# Patient Record
Sex: FEMALE | Race: WHITE | NOT HISPANIC OR LATINO | Employment: FULL TIME | URBAN - METROPOLITAN AREA
[De-identification: names, ages, dates, MRNs, and addresses within clinical notes are randomized per-mention and may not be internally consistent; named-entity substitution may affect disease eponyms.]

---

## 2017-01-31 ENCOUNTER — TRANSFERRED RECORDS (OUTPATIENT)
Dept: HEALTH INFORMATION MANAGEMENT | Facility: CLINIC | Age: 27
End: 2017-01-31

## 2018-10-04 ENCOUNTER — OFFICE VISIT (OUTPATIENT)
Dept: FAMILY MEDICINE | Facility: CLINIC | Age: 28
End: 2018-10-04
Payer: COMMERCIAL

## 2018-10-04 VITALS
SYSTOLIC BLOOD PRESSURE: 110 MMHG | HEART RATE: 79 BPM | DIASTOLIC BLOOD PRESSURE: 60 MMHG | OXYGEN SATURATION: 98 % | HEIGHT: 64 IN | WEIGHT: 151.9 LBS | TEMPERATURE: 98 F | BODY MASS INDEX: 25.93 KG/M2

## 2018-10-04 DIAGNOSIS — K58.0 IRRITABLE BOWEL SYNDROME WITH DIARRHEA: Primary | ICD-10-CM

## 2018-10-04 PROCEDURE — 99214 OFFICE O/P EST MOD 30 MIN: CPT | Performed by: PHYSICIAN ASSISTANT

## 2018-10-04 RX ORDER — CETIRIZINE HYDROCHLORIDE 10 MG/1
10 TABLET ORAL DAILY
COMMUNITY

## 2018-10-04 NOTE — MR AVS SNAPSHOT
"              After Visit Summary   10/4/2018    Shima Carvalho    MRN: [id number]           Patient Information     Date Of Birth          1990        Visit Information        Provider Department      10/4/2018 9:00 AM Aaseby-Aguilera, Ramona Ann, PA-C [facility]        Today's Diagnoses     Probable Irritable Bowel Syndrome with Diarrhea    -  1       Follow-ups after your visit        Follow-up notes from your care team     Return in about 6 months (around 2019) for Physical Exam.      Who to contact     If you have questions or need follow up information about today's clinic visit or your schedule please contact [facility] directly at [phone].  Normal or non-critical lab and imaging results will be communicated to you by MyChart, letter or phone within 4 business days after the clinic has received the results. If you do not hear from us within 7 days, please contact the clinic through MyChart or phone. If you have a critical or abnormal lab result, we will notify you by phone as soon as possible.  Submit refill requests through [facility] or call your pharmacy and they will forward the refill request to us. Please allow 3 business days for your refill to be completed.          Additional Information About Your Visit        MyChart Information     [facility] lets you send messages to your doctor, view your test results, renew your prescriptions, schedule appointments and more. To sign up, go to [email or web address] . Click on \"Log in\" on the left side of the screen, which will take you to the Welcome page. Then click on \"Sign up Now\" on the right side of the page.     You will be asked to enter the access code listed below, as well as some personal information. Please follow the directions to create your username and password.     Your access code is: [email or web address]  Expires: 2018  9:05 AM     Your access code will  in 90 days. If you need help or a new code, " "please call your [place] clinic or [phone].        Care EveryWhere ID     This is your Care EveryWhere ID. This could be used by other organizations to access your [place] medical records  [id number]        Your Vitals Were     Pulse Temperature Height Pulse Oximetry BMI (Body Mass Index)       79 98  F (36.7  C) (Oral) 5' 4\" (1.626 m) 98% 26.07 kg/m2        Blood Pressure from Last 3 Encounters:   10/04/18 110/60   10/28/16 101/70   03/28/15 106/70    Weight from Last 3 Encounters:   10/04/18 151 lb 14.4 oz (68.9 kg)   10/28/16 153 lb 6.4 oz (69.6 kg)   03/28/15 144 lb (65.3 kg)              Today, you had the following     No orders found for display         Today's Medication Changes          These changes are accurate as of 10/4/18 12:55 PM.  If you have any questions, ask your nurse or doctor.               Stop taking these medicines if you haven't already. Please contact your care team if you have questions.     GUANFACINE HCL PO   Stopped by:  Aaseby-Aguilera, Ramona Ann, PA-C                    Primary Care Provider Office Phone # Fax #    Nadir Meneses [phone]       [facility] [address]  [place] [address]        Equal Access to Services     [facility] Center[facility]IANA : Hadii kyara parker hadgregoryo Sorodney, waaxda luqadaha, qaybta kaalmada ashleigh, joie gonzalez . So [facility] [phone].    ATENCIÓN: Si habla español, tiene a nina disposición servicios gratuitos de asistencia lingüística. Llame al [phone].    We comply with applicable federal civil rights laws and [place] laws. We do not discriminate on the basis of race, color, national origin, age, disability, sex, sexual orientation, or gender identity.            Thank you!     Thank you for choosing [facility]  for your care. Our goal is always to provide you with excellent care. Hearing back from our patients is one way we can continue to improve " our services. Please take a few minutes to complete the written survey that you may receive in the mail after your visit with us. Thank you!             Your Updated Medication List - Protect others around you: Learn how to safely use, store and throw away your medicines at www.disposemymeds.org.          This list is accurate as of 10/4/18 12:55 PM.  Always use your most recent med list.                   Brand Name Dispense Instructions for use Diagnosis    cetirizine 10 MG tablet    zyrTEC     Take 10 mg by mouth daily        fluticasone 50 MCG/ACT spray    FLONASE    1 Package    Spray 1-2 sprays into both nostrils daily    Acute sinusitis

## 2018-10-04 NOTE — PROGRESS NOTES
"  SUBJECTIVE:   Shima Carvalho is a 28 year old female who presents to clinic today for the following health issues:      Discuss IBS  -  Patient has forms to fill out for work     Carson has had trouble with gut pain and diarrhea for a number of years and was checked a couple years ago for celiac and labs came back normal.  She gave up wheat and stated she had a great deal of improvement but she does have periods where she will have the cramping and diarrhea and in her position at work she has trouble leaving to use the bathroom and she states she ends up having to leave work  She would like to get FMLA paperwork filled out to allow her 1 day a month if she should need it          Problem list and histories reviewed & adjusted, as indicated.  Additional history: as documented    Current Outpatient Prescriptions   Medication Sig Dispense Refill     cetirizine (ZYRTEC) 10 MG tablet Take 10 mg by mouth daily       fluticasone (FLONASE) 50 MCG/ACT nasal spray Spray 1-2 sprays into both nostrils daily 1 Package 0     BP Readings from Last 3 Encounters:   10/04/18 110/60   10/28/16 101/70   03/28/15 106/70    Wt Readings from Last 3 Encounters:   10/04/18 151 lb 14.4 oz (68.9 kg)   10/28/16 153 lb 6.4 oz (69.6 kg)   03/28/15 144 lb (65.3 kg)                    Reviewed and updated as needed this visit by clinical staff  Allergies       Reviewed and updated as needed this visit by Provider         ROS:  Constitutional, HEENT, cardiovascular, pulmonary, gi and gu systems are negative, except as otherwise noted.    OBJECTIVE:                                                    /60 (BP Location: Right arm, Patient Position: Chair, Cuff Size: Adult Large)  Pulse 79  Temp 98  F (36.7  C) (Oral)  Ht 5' 4\" (1.626 m)  Wt 151 lb 14.4 oz (68.9 kg)  SpO2 98%  BMI 26.07 kg/m2  Body mass index is 26.07 kg/(m^2).  GENERAL APPEARANCE: healthy, alert and no distress  HENT: ear canals and TM's normal and nose and mouth " without ulcers or lesions  RESP: lungs clear to auscultation - no rales, rhonchi or wheezes  CV: regular rates and rhythm, normal S1 S2, no S3 or S4 and no murmur, click or rub  ABDOMEN: soft, nontender, without hepatosplenomegaly or masses and bowel sounds normal  PSYCH: mentation appears normal and affect normal/bright         ASSESSMENT/PLAN:                                                    1. Probable Irritable Bowel Syndrome with Diarrhea  Has done much better since giving up wheat and gluten but still has periods of time approximately 1 or 2 times a month when she will have got a week and diarrhea and needs to spend a lot of time in the bathroom.  She was checked for celiac a couple years ago and that was negative.  Filled out [place] paperwork to allow her 1 day a month if needed      There are no Patient Instructions on file for this visit.    Ramona Ann Aaseby-Aguilera, PA-C  [employer]

## 2018-10-08 ENCOUNTER — TELEPHONE (OUTPATIENT)
Dept: FAMILY MEDICINE | Facility: CLINIC | Age: 28
End: 2018-10-08

## 2018-10-08 NOTE — TELEPHONE ENCOUNTER
Patient states [facility] paper work was not completed correctly.  She will be faxing over the request that her employer is wanting for clarification.  Please reprint the forms we have in Media tab.  Make corrections and fax back.

## 2018-10-11 NOTE — TELEPHONE ENCOUNTER
Form faxed to [phone], emailed to patient at [email or web address]. Sent form to abstraction. Brynn Concepcion

## 2019-02-25 ENCOUNTER — TELEPHONE (OUTPATIENT)
Dept: FAMILY MEDICINE | Facility: CLINIC | Age: 29
End: 2019-02-25

## 2019-02-25 NOTE — TELEPHONE ENCOUNTER
[employer] forms received please review and complete.  Fax to [phone]    Forms filled out 10/8/18 attached for review     Do you want to see patient?    Forms placed on PCP desk    Vaishali Gardner

## 2019-03-14 ENCOUNTER — OFFICE VISIT (OUTPATIENT)
Dept: FAMILY MEDICINE | Facility: CLINIC | Age: 29
End: 2019-03-14
Payer: COMMERCIAL

## 2019-03-14 VITALS
SYSTOLIC BLOOD PRESSURE: 103 MMHG | OXYGEN SATURATION: 99 % | HEART RATE: 93 BPM | BODY MASS INDEX: 27.3 KG/M2 | DIASTOLIC BLOOD PRESSURE: 62 MMHG | HEIGHT: 64 IN | TEMPERATURE: 98 F | WEIGHT: 159.9 LBS

## 2019-03-14 DIAGNOSIS — K58.0 IRRITABLE BOWEL SYNDROME WITH DIARRHEA: Primary | ICD-10-CM

## 2019-03-14 PROCEDURE — 99214 OFFICE O/P EST MOD 30 MIN: CPT | Performed by: PHYSICIAN ASSISTANT

## 2019-03-14 ASSESSMENT — MIFFLIN-ST. JEOR: SCORE: 1445.3

## 2019-03-14 NOTE — PROGRESS NOTES
SUBJECTIVE:   Shima Carvalho is a 28 year old female who presents to clinic today for the following health issues:      fmla form: Carson has had episodes of abdominal pain and cramping along with diarrhea for years.  She used to get these symptoms almost daily until she gave up wheat.  Symptoms have gotten much better however she does have an episode approximately once a month and is usually the week before her periods where she gets very bloated and abdominal cramp and diarrhea.  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  She would like to get [place] paperwork to protect her job and allow her frequent bathroom breaks as well as a leave of absence if symptoms get really bad        Problem list and histories reviewed & adjusted, as indicated.  Additional history: as documented    Current Outpatient Medications   Medication Sig Dispense Refill     cetirizine (ZYRTEC) 10 MG tablet Take 10 mg by mouth daily       fluticasone (FLONASE) 50 MCG/ACT nasal spray Spray 1-2 sprays into both nostrils daily 1 Package 0     Recent Labs   Lab Test 10/28/16  1041 01/20/15  1524 10/23/12  1106   LDL 66  --   --    HDL 55  --   --    TRIG 63  --   --    ALT 14 15 27   CR 0.78 0.68 0.58   GFRESTIMATED 89 >90  Non  GFR Calc   >90   GFRESTBLACK >90   GFR Calc   >90   GFR Calc   >90   POTASSIUM 4.3 4.1 4.2   TSH 1.60  --  0.93      BP Readings from Last 3 Encounters:   03/14/19 103/62   10/04/18 110/60   10/28/16 101/70    Wt Readings from Last 3 Encounters:   03/14/19 72.5 kg (159 lb 14.4 oz)   10/04/18 68.9 kg (151 lb 14.4 oz)   10/28/16 69.6 kg (153 lb 6.4 oz)                    Reviewed and updated as needed this visit by clinical staff  Tobacco  Allergies  Meds  Med Hx  Surg Hx  Fam Hx  Soc Hx      Reviewed and updated as needed this visit by Provider      "    ROS:  Constitutional, HEENT, cardiovascular, pulmonary, gi and gu systems are negative, except as otherwise noted.    OBJECTIVE:                                                    /62 (BP Location: Right arm, Patient Position: Chair, Cuff Size: Adult Large)   Pulse 93   Temp 98  F (36.7  C) (Oral)   Ht 1.626 m (5' 4\")   Wt 72.5 kg (159 lb 14.4 oz)   SpO2 99%   BMI 27.45 kg/m    Body mass index is 27.45 kg/m .  GENERAL APPEARANCE: healthy, alert and no distress  HENT: ear canals and TM's normal and nose and mouth without ulcers or lesions  RESP: lungs clear to auscultation - no rales, rhonchi or wheezes  CV: regular rates and rhythm, normal S1 S2, no S3 or S4 and no murmur, click or rub  ABDOMEN: soft, nontender, without hepatosplenomegaly or masses and bowel sounds normal         ASSESSMENT/PLAN:                                                    (K58.0) Probable Irritable Bowel Syndrome with Diarrhea  (primary encounter diagnosis)  Comment:   Plan: symtpoms improved after eliminating wheat from diet but still has approximately 1 episode a month of sever diarrhea and abdominal pain.  [place] Ramona Ann Aaseby-Aguilera, PA-C  [facility]      "

## 2019-08-22 ENCOUNTER — TELEPHONE (OUTPATIENT)
Dept: FAMILY MEDICINE | Facility: CLINIC | Age: 29
End: 2019-08-22

## 2019-08-22 NOTE — TELEPHONE ENCOUNTER
FMLA forms received for patient.      Copy from 02/25/2019 attached    Do you want to see patient in clinic?    Vaishali Gardner

## 2019-08-29 NOTE — TELEPHONE ENCOUNTER
Form completed and faxed to [employer] at [phone]. Sent a copy to abstraction and Candis devlin. Brynn Concepcion

## 2019-09-23 ENCOUNTER — TRANSFERRED RECORDS (OUTPATIENT)
Dept: HEALTH INFORMATION MANAGEMENT | Facility: CLINIC | Age: 29
End: 2019-09-23

## 2020-01-21 ENCOUNTER — TELEPHONE (OUTPATIENT)
Dept: FAMILY MEDICINE | Facility: CLINIC | Age: 30
End: 2020-01-21

## 2020-01-21 NOTE — TELEPHONE ENCOUNTER
Patient called back the form is renewal of  FMLA. Patient states nothing has change and just needs 1 episode once a month. Printed last year form and filled out the form. Form is placed in Beulah's folder at her station to review and sign. Once complete please fax to [phone], send form to abstraction and copy to [employer].    Patient was told she will be due for a office visit in March.    Brynn Concepcion

## 2020-01-21 NOTE — TELEPHONE ENCOUNTER
LMTRC- Please inform patient we received FMLA form stating patient is requesting leave do to serious health condition. Last visit was on 3/14/19 and Beulah filled out FMLA for intermittent leave 1 episode per once a month. If things have change with patient condition she needs to make an appointment with Beulah to discuss.    Form is located in nurse folder at the [facility] station.    Brynn Concepcion

## 2020-01-24 NOTE — TELEPHONE ENCOUNTER
Form completed and fax to [phone], sent form to abstraction and Candis devlin. Brynn Concepcion

## 2023-08-30 ENCOUNTER — LAB REQUISITION (OUTPATIENT)
Dept: LAB | Facility: CLINIC | Age: 33
End: 2023-08-30

## 2023-08-30 ENCOUNTER — MEDICAL CORRESPONDENCE (OUTPATIENT)
Dept: HEALTH INFORMATION MANAGEMENT | Facility: CLINIC | Age: 33
End: 2023-08-30
Payer: COMMERCIAL

## 2023-08-30 DIAGNOSIS — R63.8 OTHER SYMPTOMS AND SIGNS CONCERNING FOOD AND FLUID INTAKE: ICD-10-CM

## 2023-08-30 LAB
ERYTHROCYTE [DISTWIDTH] IN BLOOD BY AUTOMATED COUNT: 13.8 % (ref 10–15)
HBA1C MFR BLD: 5.3 %
HCT VFR BLD AUTO: 39.9 % (ref 35–47)
HGB BLD-MCNC: 12.5 G/DL (ref 11.7–15.7)
MCH RBC QN AUTO: 30.3 PG (ref 26.5–33)
MCHC RBC AUTO-ENTMCNC: 31.3 G/DL (ref 31.5–36.5)
MCV RBC AUTO: 97 FL (ref 78–100)
PLATELET # BLD AUTO: 257 10E3/UL (ref 150–450)
RBC # BLD AUTO: 4.12 10E6/UL (ref 3.8–5.2)
WBC # BLD AUTO: 5.9 10E3/UL (ref 4–11)

## 2023-08-30 PROCEDURE — 83036 HEMOGLOBIN GLYCOSYLATED A1C: CPT | Performed by: OBSTETRICS & GYNECOLOGY

## 2023-08-30 PROCEDURE — 85027 COMPLETE CBC AUTOMATED: CPT | Performed by: OBSTETRICS & GYNECOLOGY

## 2023-08-31 DIAGNOSIS — M79.89 OTHER SPECIFIED SOFT TISSUE DISORDERS: ICD-10-CM

## 2023-08-31 DIAGNOSIS — M79.89 SWELLING OF BOTH LOWER EXTREMITIES: Primary | ICD-10-CM

## 2023-10-02 NOTE — PROGRESS NOTES
"CARDIOLOGY CONSULT    REASON FOR CONSULT: edema    PRIMARY CARE PHYSICIAN:  Nadir Meneses MD    HISTORY OF PRESENT ILLNESS:  Ms. Carvalho is a very pleasant 33 year old woman with PMH significant for irritable bowel who presents for the evaluation of edema.  Over the past several years during COVID, she put on about 50 pounds in weight.  She was recently seen by her primary MD and Cierra complained of her legs being \"big\" and wanted this further evaluated.  She notes heaviness in her legs.  She states she notes that her legs and calves look big to her.  Her mom has bigger legs as well.  There is no pitting edema, does not appear worse at the end of the day. She denies any hx of DVT.      PAST MEDICAL HISTORY:  Past Medical History:   Diagnosis Date    Acne     History of IBS        MEDICATIONS:  Current Outpatient Medications   Medication    cetirizine (ZYRTEC) 10 MG tablet    fluticasone (FLONASE) 50 MCG/ACT nasal spray     No current facility-administered medications for this visit.       ALLERGIES:  Allergies   Allergen Reactions    Cefaclor Hives    Ciprofloxacin      Aunt, Mother and Grandmother had achilles tendon issues    Seasonal Allergies        SOCIAL HISTORY:  Nonsmoker, no significant ETOH    FAMILY HISTORY:  I have reviewed this patient's family history and updated it with pertinent information if needed.   Family History   Problem Relation Age of Onset    Neurologic Disorder Paternal Uncle         MS, fatal .w rapid progression    Cancer Maternal Grandfather         colon also in sev of his sibs. also lung in non smokers.    Cancer - colorectal Maternal Grandfather         diagnsised at age 68    Cancer Other         colon, first cousin    Cancer Maternal Aunt         thyroid    Arthritis Maternal Aunt         rheum    Genetic Disorder Brother         adhd and tourettes-1    Cancer - colorectal Paternal Grandfather         diagnosied at age 80    Cancer Paternal Grandmother         liver " and pancreas    Family History Negative Mother     Family History Negative Father        REVIEW OF SYSTEMS:  A complete ROS was obtained and the pertinent positives are outlined in the history of present illness above.  The remainder of systems is negative.      PHYSICAL EXAM:                     Vital Signs with Ranges  BP: ()/()   Arterial Line BP: ()/()   0 lbs 0 oz    Constitutional: awake, alert, no distress  Eyes: PERRL, sclera nonicteric  ENT: trachea midline  Respiratory: CTAB  Cardiovascular: RRR no m/r/g, No JVD  GI: nondistended, nontender, bowel sounds present  Lymph/Hematologic: no lymphadenopathy  Skin: dry, no rash  Musculoskeletal: good muscle tone, bilateral legs with fatty deposits down to her ankles; feet are normal in size bilaterally.  Legs are symmetric in size.    Neurologic: no focal deficits  Neuropsychiatric: appropriate affact    DATA:  Labs: Dated 8/30/23 including CBC, TSH, CMP, HGbA1C all unremarkable    EKG: Dated 10/3/23 reviewed personally.  Sinus rhythm without ST segment changes      ASSESSMENT:   Stage II lipedema of the legs, no evidence for edema    RECOMMENDATIONS:  1. Reviewed the pathophysiology of lipedema and treatment.  Treatment includes weight loss, regular exercise, compression stockings.  Could consider referral to lymphedema clinic in the future as treatments for lymphedema may also help symptoms of lipedema.  She was given resources for more information including the lipedema foundation website.         Malinda Santo MD [facility]  October 3, 2023

## 2023-10-03 ENCOUNTER — OFFICE VISIT (OUTPATIENT)
Dept: CARDIOLOGY | Facility: CLINIC | Age: 33
End: 2023-10-03
Payer: COMMERCIAL

## 2023-10-03 VITALS
OXYGEN SATURATION: 98 % | WEIGHT: 209.7 LBS | BODY MASS INDEX: 37.16 KG/M2 | SYSTOLIC BLOOD PRESSURE: 117 MMHG | HEART RATE: 84 BPM | DIASTOLIC BLOOD PRESSURE: 82 MMHG | HEIGHT: 63 IN

## 2023-10-03 DIAGNOSIS — M79.89 SWELLING OF BOTH LOWER EXTREMITIES: ICD-10-CM

## 2023-10-03 DIAGNOSIS — M79.89 OTHER SPECIFIED SOFT TISSUE DISORDERS: ICD-10-CM

## 2023-10-03 PROCEDURE — 99203 OFFICE O/P NEW LOW 30 MIN: CPT | Performed by: INTERNAL MEDICINE

## 2023-10-03 PROCEDURE — 93000 ELECTROCARDIOGRAM COMPLETE: CPT | Performed by: INTERNAL MEDICINE

## 2023-10-03 NOTE — LETTER
"10/3/2023    Nadir Meneses MD, MD  [facility] [address]  [facility] [address]    RE: Shima Carvalho       Dear Colleague,     I had the pleasure of seeing Shima Carvalho in the [facility].  CARDIOLOGY CONSULT    REASON FOR CONSULT: edema    PRIMARY CARE PHYSICIAN:  Nadir Meneses MD    HISTORY OF PRESENT ILLNESS:  Ms. Carvalho is a very pleasant 33 year old woman with PMH significant for irritable bowel who presents for the evaluation of edema.  Over the past several years during COVID, she put on about 50 pounds in weight.  She was recently seen by her primary MD and Cierra complained of her legs being \"big\" and wanted this further evaluated.  She notes heaviness in her legs.  She states she notes that her legs and calves look big to her.  Her mom has bigger legs as well.  There is no pitting edema, does not appear worse at the end of the day. She denies any hx of DVT.      PAST MEDICAL HISTORY:  Past Medical History:   Diagnosis Date    Acne     History of IBS        MEDICATIONS:  Current Outpatient Medications   Medication    cetirizine (ZYRTEC) 10 MG tablet    fluticasone (FLONASE) 50 MCG/ACT nasal spray     No current facility-administered medications for this visit.       ALLERGIES:  Allergies   Allergen Reactions    Cefaclor Hives    Ciprofloxacin      Aunt, Mother and Grandmother had achilles tendon issues    Seasonal Allergies        SOCIAL HISTORY:  Nonsmoker, no significant ETOH    FAMILY HISTORY:  I have reviewed this patient's family history and updated it with pertinent information if needed.   Family History   Problem Relation Age of Onset    Neurologic Disorder Paternal Uncle         MS, fatal .w rapid progression    Cancer Maternal Grandfather         colon also in sev of his sibs. also lung in non smokers.    Cancer - colorectal Maternal Grandfather         diagnsised at age 68    Cancer Other         colon, first " cousin    Cancer Maternal Aunt         thyroid    Arthritis Maternal Aunt         rheum    Genetic Disorder Brother         adhd and tourettes-1    Cancer - colorectal Paternal Grandfather         diagnosied at age 80    Cancer Paternal Grandmother         liver and pancreas    Family History Negative Mother     Family History Negative Father        REVIEW OF SYSTEMS:  A complete ROS was obtained and the pertinent positives are outlined in the history of present illness above.  The remainder of systems is negative.      PHYSICAL EXAM:                     Vital Signs with Ranges  BP: ()/()   Arterial Line BP: ()/()   0 lbs 0 oz    Constitutional: awake, alert, no distress  Eyes: PERRL, sclera nonicteric  ENT: trachea midline  Respiratory: CTAB  Cardiovascular: RRR no m/r/g, No JVD  GI: nondistended, nontender, bowel sounds present  Lymph/Hematologic: no lymphadenopathy  Skin: dry, no rash  Musculoskeletal: good muscle tone, bilateral legs with fatty deposits down to her ankles; feet are normal in size bilaterally.  Legs are symmetric in size.    Neurologic: no focal deficits  Neuropsychiatric: appropriate affact    DATA:  Labs: Dated 8/30/23 including CBC, TSH, CMP, HGbA1C all unremarkable    EKG: Dated 10/3/23 reviewed personally.  Sinus rhythm without ST segment changes      ASSESSMENT:   Stage II lipedema of the legs, no evidence for edema    RECOMMENDATIONS:  1. Reviewed the pathophysiology of lipedema and treatment.  Treatment includes weight loss, regular exercise, compression stockings.  Could consider referral to lymphedema clinic in the future as treatments for lymphedema may also help symptoms of lipedema.  She was given resources for more information including the lipedema foundation website.         Malinda Santo MD [facility]  October 3, 2023      Thank you for allowing me to participate in the care of your patient.      Sincerely,     Malinda Santo MD     [facility]  [facility]  cc:   Judy Eric MD  [address] [place] [id number]

## 2023-10-03 NOTE — PATIENT INSTRUCTIONS
-Continue exercise  -Continue efforts in weight loss  -Wear compression stockings daily, take off at night  -Keep legs elevated when at rest

## 2024-09-11 ENCOUNTER — LAB REQUISITION (OUTPATIENT)
Dept: LAB | Facility: CLINIC | Age: 34
End: 2024-09-11

## 2024-09-11 DIAGNOSIS — Z13.9 ENCOUNTER FOR SCREENING, UNSPECIFIED: ICD-10-CM

## 2024-09-11 LAB
ABO/RH(D): NORMAL
ANTIBODY SCREEN: NEGATIVE
HCG INTACT+B SERPL-ACNC: 231 MIU/ML
SPECIMEN EXPIRATION DATE: NORMAL

## 2024-09-11 PROCEDURE — 86900 BLOOD TYPING SEROLOGIC ABO: CPT | Performed by: OBSTETRICS & GYNECOLOGY

## 2024-09-11 PROCEDURE — 84702 CHORIONIC GONADOTROPIN TEST: CPT | Performed by: OBSTETRICS & GYNECOLOGY

## 2024-09-13 ENCOUNTER — LAB REQUISITION (OUTPATIENT)
Dept: LAB | Facility: CLINIC | Age: 34
End: 2024-09-13
Payer: COMMERCIAL

## 2024-09-13 DIAGNOSIS — Z32.01 ENCOUNTER FOR PREGNANCY TEST, RESULT POSITIVE: ICD-10-CM

## 2024-09-13 LAB — HCG INTACT+B SERPL-ACNC: 58 MIU/ML

## 2024-09-13 PROCEDURE — 84702 CHORIONIC GONADOTROPIN TEST: CPT | Mod: ORL | Performed by: OBSTETRICS & GYNECOLOGY

## 2024-09-15 ENCOUNTER — LAB (OUTPATIENT)
Dept: LAB | Facility: CLINIC | Age: 34
End: 2024-09-15
Payer: COMMERCIAL

## 2024-09-15 DIAGNOSIS — Z32.01 PREGNANCY TEST POSITIVE: ICD-10-CM

## 2024-09-15 LAB — HCG INTACT+B SERPL-ACNC: 21 MIU/ML

## 2024-09-15 PROCEDURE — 36415 COLL VENOUS BLD VENIPUNCTURE: CPT

## 2024-09-15 PROCEDURE — 84702 CHORIONIC GONADOTROPIN TEST: CPT

## 2024-09-23 ENCOUNTER — LAB REQUISITION (OUTPATIENT)
Dept: LAB | Facility: CLINIC | Age: 34
End: 2024-09-23

## 2024-09-23 DIAGNOSIS — Z32.01 ENCOUNTER FOR PREGNANCY TEST, RESULT POSITIVE: ICD-10-CM

## 2024-09-23 LAB — HCG INTACT+B SERPL-ACNC: 1 MIU/ML

## 2024-09-23 PROCEDURE — 84702 CHORIONIC GONADOTROPIN TEST: CPT | Performed by: OBSTETRICS & GYNECOLOGY

## 2025-02-11 ENCOUNTER — LAB REQUISITION (OUTPATIENT)
Dept: LAB | Facility: CLINIC | Age: 35
End: 2025-02-11
Payer: COMMERCIAL

## 2025-02-11 DIAGNOSIS — Z34.90 ENCOUNTER FOR SUPERVISION OF NORMAL PREGNANCY, UNSPECIFIED, UNSPECIFIED TRIMESTER: ICD-10-CM

## 2025-02-11 PROCEDURE — 87086 URINE CULTURE/COLONY COUNT: CPT | Performed by: ADVANCED PRACTICE MIDWIFE

## 2025-02-11 PROCEDURE — 87491 CHLMYD TRACH DNA AMP PROBE: CPT | Performed by: ADVANCED PRACTICE MIDWIFE

## 2025-02-12 LAB
C TRACH DNA SPEC QL PROBE+SIG AMP: NEGATIVE
N GONORRHOEA DNA SPEC QL NAA+PROBE: NEGATIVE
SPECIMEN TYPE: NORMAL

## 2025-02-13 LAB — BACTERIA UR CULT: NORMAL
